# Patient Record
Sex: FEMALE | Race: WHITE | NOT HISPANIC OR LATINO | Employment: OTHER | URBAN - METROPOLITAN AREA
[De-identification: names, ages, dates, MRNs, and addresses within clinical notes are randomized per-mention and may not be internally consistent; named-entity substitution may affect disease eponyms.]

---

## 2018-10-22 ENCOUNTER — TELEPHONE (OUTPATIENT)
Dept: OBSTETRICS AND GYNECOLOGY | Facility: CLINIC | Age: 68
End: 2018-10-22

## 2018-10-22 NOTE — TELEPHONE ENCOUNTER
----- Message from Elidia Santoyo sent at 10/22/2018  9:04 AM CDT -----  Contact: self  Sharon Guzman  MRN: [id number]  Home Phone [phone]  Work Phone   Not on file.  Mobile  [phone]    Patient Care Team:  Lazarus Echols MD as PCP - General (General Practice)  Amirah Quintana MD as Obstetrician (Obstetrics)  OB? No  What phone number can you be reached at? [phone]  Message:   Pt is changing physicians to a [facility] MD. She would like to have mammogram performed at [facility]. She requested assistance with report. Please have nurse return call. Thank you.

## 2022-03-31 PROCEDURE — 83993 ASSAY FOR CALPROTECTIN FECAL: CPT | Performed by: INTERNAL MEDICINE

## 2022-03-31 PROCEDURE — 82705 FATS/LIPIDS FECES QUAL: CPT | Performed by: INTERNAL MEDICINE

## 2022-03-31 PROCEDURE — 82656 EL-1 FECAL QUAL/SEMIQ: CPT | Performed by: INTERNAL MEDICINE

## 2022-04-01 ENCOUNTER — LAB VISIT (OUTPATIENT)
Dept: LAB | Facility: HOSPITAL | Age: 72
End: 2022-04-01
Attending: INTERNAL MEDICINE
Payer: MEDICARE

## 2022-04-01 DIAGNOSIS — R19.7 DIARRHEA: Primary | ICD-10-CM

## 2022-04-04 ENCOUNTER — HOSPITAL ENCOUNTER (OUTPATIENT)
Dept: RADIOLOGY | Facility: HOSPITAL | Age: 72
Discharge: HOME | End: 2022-04-04
Attending: INTERNAL MEDICINE
Payer: MEDICARE

## 2022-04-04 DIAGNOSIS — R19.4 CHANGE IN BOWEL HABITS: ICD-10-CM

## 2022-04-04 DIAGNOSIS — R14.0 BLOATING: ICD-10-CM

## 2022-04-04 LAB
FAT STL QL: NORMAL
NEUTRAL FAT STL QL: NORMAL

## 2022-04-04 PROCEDURE — 25500020 PHARM REV CODE 255: Performed by: RADIOLOGY

## 2022-04-04 PROCEDURE — 74177 CT ABD & PELVIS W/CONTRAST: CPT | Mod: 26,,, | Performed by: RADIOLOGY

## 2022-04-04 PROCEDURE — 74177 CT ABDOMEN PELVIS WITH CONTRAST: ICD-10-PCS | Mod: 26,,, | Performed by: RADIOLOGY

## 2022-04-04 PROCEDURE — 74177 CT ABD & PELVIS W/CONTRAST: CPT | Mod: TC

## 2022-04-04 RX ADMIN — IOHEXOL 100 ML: 350 INJECTION, SOLUTION INTRAVENOUS at 02:04

## 2022-04-04 RX ADMIN — IOHEXOL 30 ML: 350 INJECTION, SOLUTION INTRAVENOUS at 02:04

## 2022-04-05 ENCOUNTER — LAB VISIT (OUTPATIENT)
Dept: LAB | Facility: HOSPITAL | Age: 72
End: 2022-04-05
Attending: INTERNAL MEDICINE
Payer: MEDICARE

## 2022-04-05 DIAGNOSIS — R10.9 ABDOMINAL PAIN: ICD-10-CM

## 2022-04-05 DIAGNOSIS — R19.7 DIARRHEA: Primary | ICD-10-CM

## 2022-04-05 LAB
CRP SERPL-MCNC: 0.9 MG/L (ref 0–8.2)
ERYTHROCYTE [SEDIMENTATION RATE] IN BLOOD BY WESTERGREN METHOD: 12 MM/HR (ref 0–20)

## 2022-04-05 PROCEDURE — 86140 C-REACTIVE PROTEIN: CPT | Performed by: INTERNAL MEDICINE

## 2022-04-05 PROCEDURE — 85651 RBC SED RATE NONAUTOMATED: CPT | Performed by: INTERNAL MEDICINE

## 2022-04-05 PROCEDURE — 36415 COLL VENOUS BLD VENIPUNCTURE: CPT | Performed by: INTERNAL MEDICINE

## 2022-04-06 LAB
CALPROTECTIN STL-MCNT: 85.2 MCG/G
ELASTASE 1, FECAL: 316 MCG/G

## 2022-04-12 ENCOUNTER — LAB VISIT (OUTPATIENT)
Dept: LAB | Facility: HOSPITAL | Age: 72
End: 2022-04-12
Attending: NURSE PRACTITIONER
Payer: MEDICARE

## 2022-04-12 DIAGNOSIS — K65.4 MESENTERIC PANNICULITIS: ICD-10-CM

## 2022-04-12 DIAGNOSIS — R63.4 WEIGHT LOSS: ICD-10-CM

## 2022-04-12 DIAGNOSIS — R14.2 BELCHING: ICD-10-CM

## 2022-04-12 DIAGNOSIS — R14.0 BLOATING: Primary | ICD-10-CM

## 2022-04-12 DIAGNOSIS — R19.7 DIARRHEA: ICD-10-CM

## 2022-04-12 PROCEDURE — 36415 COLL VENOUS BLD VENIPUNCTURE: CPT | Performed by: NURSE PRACTITIONER

## 2022-04-12 PROCEDURE — 86316 IMMUNOASSAY TUMOR OTHER: CPT | Performed by: NURSE PRACTITIONER

## 2022-04-13 LAB — CGA SERPL-MCNC: 211 NG/ML

## 2022-04-19 ENCOUNTER — TELEPHONE (OUTPATIENT)
Dept: NEUROLOGY | Facility: HOSPITAL | Age: 72
End: 2022-04-19
Payer: MEDICARE

## 2022-04-19 DIAGNOSIS — E34.0 CARCINOID SYNDROME: Primary | ICD-10-CM

## 2022-04-19 NOTE — TELEPHONE ENCOUNTER
----- Message from Albaniashimon Carter sent at 4/19/2022  1:42 PM CDT -----  New patient - Who called:fax    Referral source- MD/patient/Internet/other:Lazarus Echols MD     Why do you need to be seen? Elevated chromogranin    Requested physician:  None specified   Patient call back #: [phone]      Records:  Clinic visits  Procedures  Colonoscopy  Egd  Scans  Labs  Pathology

## 2022-04-19 NOTE — TELEPHONE ENCOUNTER
----- Message from Eric Carter sent at 4/19/2022 11:31 AM CDT -----  Pt is being referred for a carcinoid eval. I have scanned the referral/records into media mgr within Epic. Please review and contact pt for scheduling,thanks

## 2022-04-20 ENCOUNTER — TELEPHONE (OUTPATIENT)
Dept: NEUROLOGY | Facility: HOSPITAL | Age: 72
End: 2022-04-20
Payer: MEDICARE

## 2022-04-20 NOTE — TELEPHONE ENCOUNTER
----- Message from Geraldine Marie RN sent at 4/20/2022 10:15 AM CDT -----  New pt, please contact.   Thanks    ----- Message -----  From: Farzana Calixto  Sent: 4/20/2022   9:44 AM CDT  To: Sonia Beckman Staff    Type:  Patient Returning Call    Who Called: pt  Who Left Message for Patient: office  Does the patient know what this is regarding?: appt  Would the patient rather a call back or a response via [facility]?  call  Best Call Back Number: [phone]  Additional Information:

## 2022-04-21 ENCOUNTER — TELEPHONE (OUTPATIENT)
Dept: NEUROLOGY | Facility: HOSPITAL | Age: 72
End: 2022-04-21
Payer: MEDICARE

## 2022-04-21 NOTE — TELEPHONE ENCOUNTER
Returned call.  She had questions about the lab tests ordered, what they were and what they were for. Explained, all questions answered.

## 2022-04-21 NOTE — TELEPHONE ENCOUNTER
----- Message from Sally Mccray sent at 4/21/2022  7:50 AM CDT -----  Contact: [phone]/self  Who Called: PT  Regarding: pt has a few questions about blood work, requesting to speak with christiano   Would the patient rather a call back or a response via [facility]? Call back  Best Call Back Number:[phone]   Additional Information: n/a

## 2022-04-29 ENCOUNTER — LAB VISIT (OUTPATIENT)
Dept: LAB | Facility: HOSPITAL | Age: 72
End: 2022-04-29
Attending: SURGERY
Payer: MEDICARE

## 2022-04-29 DIAGNOSIS — E34.0 CARCINOID SYNDROME: ICD-10-CM

## 2022-04-29 PROCEDURE — 82941 ASSAY OF GASTRIN: CPT | Performed by: SURGERY

## 2022-04-29 PROCEDURE — 86256 FLUORESCENT ANTIBODY TITER: CPT | Performed by: SURGERY

## 2022-04-29 PROCEDURE — 36415 COLL VENOUS BLD VENIPUNCTURE: CPT | Performed by: SURGERY

## 2022-04-29 PROCEDURE — 83519 RIA NONANTIBODY: CPT | Performed by: SURGERY

## 2022-04-29 PROCEDURE — 82542 COL CHROMOTOGRAPHY QUAL/QUAN: CPT | Performed by: SURGERY

## 2022-05-02 LAB — GASTRIN SERPL-MCNC: 21 PG/ML

## 2022-05-04 LAB — PCA AB TITR SER IF: NORMAL {TITER}

## 2022-05-10 LAB — PANCREASTATIN SERPL-MCNC: 70 PG/ML (ref 10–135)

## 2022-05-11 LAB — 5OH-INDOLEACETATE SERPL-MCNC: 8 NG/ML

## 2024-01-25 ENCOUNTER — TELEPHONE (OUTPATIENT)
Dept: PSYCHIATRY | Facility: CLINIC | Age: 74
End: 2024-01-25
Payer: MEDICARE

## 2024-01-25 NOTE — TELEPHONE ENCOUNTER
Informed patient that we do not see patients over the age of 69 and gave her other recommendations. She voiced understanding

## 2024-01-25 NOTE — TELEPHONE ENCOUNTER
----- Message from Jazmin Ramirez sent at 2024  9:05 AM CST -----  Contact: Patient  Sharon Guzman  MRN: [id number]  : 1950  PCP: Mary Jo, Primary Doctor  Home Phone      [phone]  Work Phone      Not on file.  Mobile          [phone]      MESSAGE: Patient would like to be seen for anxiety. She has been having panic attacks everyday    PHONE; [phone]

## 2024-11-04 ENCOUNTER — HOSPITAL ENCOUNTER (EMERGENCY)
Facility: HOSPITAL | Age: 74
Discharge: HOME | End: 2024-11-04
Attending: EMERGENCY MEDICINE
Payer: MEDICARE

## 2024-11-04 VITALS
WEIGHT: 179 LBS | DIASTOLIC BLOOD PRESSURE: 68 MMHG | TEMPERATURE: 98 F | RESPIRATION RATE: 20 BRPM | HEART RATE: 74 BPM | OXYGEN SATURATION: 98 % | SYSTOLIC BLOOD PRESSURE: 148 MMHG | BODY MASS INDEX: 26.51 KG/M2 | HEIGHT: 69 IN
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
## 2024-11-04 DIAGNOSIS — R07.9 CHEST PAIN: ICD-10-CM

## 2024-11-04 DIAGNOSIS — R07.89 CHEST WALL PAIN: ICD-10-CM

## 2024-11-04 PROCEDURE — 25000003 PHARM REV CODE 250: Performed by: EMERGENCY MEDICINE

## 2024-11-04 PROCEDURE — 93010 ELECTROCARDIOGRAM REPORT: CPT | Mod: ,,, | Performed by: INTERNAL MEDICINE

## 2024-11-04 PROCEDURE — 93005 ELECTROCARDIOGRAM TRACING: CPT

## 2024-11-04 PROCEDURE — 99284 EMERGENCY DEPT VISIT MOD MDM: CPT | Mod: 25

## 2024-11-04 RX ORDER — METHOCARBAMOL 500 MG/1
1000 TABLET, FILM COATED ORAL 3 TIMES DAILY
Qty: 30 TABLET | Refills: 0 | Status: SHIPPED | OUTPATIENT
Start: 2024-11-04 | End: 2024-11-09

## 2024-11-04 RX ORDER — MUPIROCIN 20 MG/G
OINTMENT TOPICAL 3 TIMES DAILY
Qty: 15 G | Refills: 0 | Status: SHIPPED | OUTPATIENT
Start: 2024-11-04

## 2024-11-04 RX ORDER — METHOCARBAMOL 500 MG/1
1000 TABLET, FILM COATED ORAL
Status: COMPLETED | OUTPATIENT
Start: 2024-11-04 | End: 2024-11-04

## 2024-11-04 RX ADMIN — METHOCARBAMOL 1000 MG: 500 TABLET ORAL at 04:11

## 2024-11-04 NOTE — ED TRIAGE NOTES
Patient reports that her shoe got caught on the floor and caused her to fall hitting a love seat pta. Patient became SOB, weak, and dizzy after falling. Patient with c/o pain to right side of chest and skin tears to right upper arm. Reports also hit her head.

## 2024-11-04 NOTE — ED PROVIDER NOTES
Encounter Date: 11/4/2024       History     Chief Complaint   Patient presents with    Fall    Chest Pain     HPI      Patient is a 74-year- female past medical history  hyperlipidemia presenting after a slip and fall.  She hit her head.  No loss of consciousness.  She states she caught herself with her right arm and she feels pain from her right shoulder into her chest.  The pain is so beer she was having difficulty taking a deep breath.  Review of patient's allergies indicates:  No Known Allergies  Past Medical History:   Diagnosis Date    Anemia     Diverticulitis     Hyperlipidemia     Hypoglycemia     Mental disorder      Past Surgical History:   Procedure Laterality Date    APPENDECTOMY      HYSTERECTOMY      ZA-BSO--Fibroids     OOPHORECTOMY       Family History   Problem Relation Name Age of Onset    Colon cancer Paternal Grandmother      Breast cancer Neg Hx      Ovarian cancer Neg Hx       Social History     Tobacco Use    Smoking status: Never   Substance Use Topics    Alcohol use: No    Drug use: No     Review of Systems   Constitutional:  Negative for chills and fever.   Respiratory:  Negative for cough.    Cardiovascular:  Positive for chest pain.   Musculoskeletal:  Positive for back pain.   Neurological:  Positive for headaches.   All other systems reviewed and are negative.    Social Drivers of Health     Tobacco Use: Unknown (11/4/2024)    Patient History     Smoking Tobacco Use: Never     Smokeless Tobacco Use: Unknown     Passive Exposure: Not on file   Alcohol Use: Not on file   Financial Resource Strain: Not on file   Food Insecurity: Not on file   Transportation Needs: Not on file   Physical Activity: Not on file   Stress: Not on file   Housing Stability: Not on file   Depression: Not on file   Utilities: Not on file   Health Literacy: Not on file   Social Isolation: Not on file      Physical Exam     Initial Vitals [11/04/24 1634]   BP Pulse Resp Temp SpO2   138/67 64 20 98.3 °F (36.8 °C)  100 %      MAP       --         Physical Exam    Nursing note and vitals reviewed.  Constitutional: She appears well-developed and well-nourished.   HENT:   Head: Normocephalic and atraumatic. Mouth/Throat: Oropharynx is clear and moist.   Eyes: EOM are normal. Pupils are equal, round, and reactive to light.   Neck: No JVD present.   Normal range of motion.  Cardiovascular:  Normal rate and intact distal pulses.           Pulmonary/Chest: Breath sounds normal. No stridor.   Abdominal: Abdomen is soft.   Musculoskeletal:         General: Normal range of motion.      Cervical back: Normal range of motion.      Comments: No midline c spine tenderness     Neurological: She is alert and oriented to person, place, and time. GCS score is 15. GCS eye subscore is 4. GCS verbal subscore is 5. GCS motor subscore is 6.   Skin: Capillary refill takes less than 2 seconds.     Dictation #1  MRN:[id number]  CSN:[id number]     ED Course   Procedures  Labs Reviewed - No data to display  EKG Readings: (Independently Interpreted)   Initial Reading: No STEMI. Rhythm: Normal Sinus Rhythm. Heart Rate: 73. Ectopy: No Ectopy. Conduction: Normal. ST Segments: Normal ST Segments. T Waves: Normal. Axis: Normal.     ECG Results              EKG 12-lead (Final result)        Collection Time Result Time QRS Duration OHS QTC Calculation    11/04/24 16:34:38 11/06/24 13:55:39 98 425                     Final result by Interface, Lab [facility] (11/06/24 13:55:48)                   Narrative:    Test Reason : R07.9    Vent. Rate : 073 BPM     Atrial Rate : 073 BPM     P-R Int : 174 ms          QRS Dur : 098 ms      QT Int : 386 ms       P-R-T Axes : 079 068 066 degrees     QTc Int : 425 ms    Normal sinus rhythm  Normal ECG  No previous ECGs available  Confirmed by Milton Calderon MD ([id number]) on 11/6/2024 1:55:34 PM    Referred By: [email or web address]   SELF           Confirmed By:Milton Calderon MD                                  Imaging Results              CT  Chest Without Contrast (Final result)  Result time 11/04/24 17:54:03      Final result by Semaj Smith MD (11/04/24 17:54:03)                   Impression:      No CT evidence of acute intrathoracic process.  No definite acute displaced rib fractures identified.    Several small pulmonary micronodules measuring up to 0.3 cm.  For multiple solid nodules all <6 mm, Fleischner Society 2017 guidelines recommend no routine follow up for a low risk patient, or follow up with non-contrast chest CT at 12 months after discovery in a high risk patient.    Mild coronary artery calcification.    Additional incidental findings as above.      Electronically signed by: Semaj Smith MD  Date:    11/04/2024  Time:    17:54               Narrative:    EXAMINATION:  CT CHEST WITHOUT CONTRAST    CLINICAL HISTORY:  trauma;    TECHNIQUE:  Low dose axial images, sagittal and coronal reformations were obtained from the thoracic inlet to the lung bases. Contrast was not administered.    COMPARISON:  None.    FINDINGS:  Examination of the vascular and soft tissue structures at the base of the neck is unremarkable.    The thoracic aorta maintains normal caliber, contour, and course with mild atherosclerotic calcification within its course.  The heart is not enlarged and there is no evidence of pericardial effusion.  There is mild coronary artery calcification.The esophagus maintains a normal course and caliber. There is no bulky axillary or mediastinal lymph node enlargement.  Evaluation of the hilar region is limited by lack of IV contrast.    The trachea is midline and the proximal airways are patent. There is no pneumothorax.  There is mild biapical pleuroparenchymal scarring with scattered peripheral reticular opacities of the bilateral upper lobes.  There is no large confluent airspace consolidation.  There are a few scattered pulmonary micronodules throughout the lungs measuring up to 0.3 cm.  There is no significant pleural  fluid.    Limited views of the abdomen demonstrate nothing unusual on this noncontrast examination.    Osseous structures demonstrate mild degenerative changes of the visualized thoracic spine.  No definite acute displaced rib fractures identified..                                       CT Cervical Spine Without Contrast (Final result)  Result time 11/04/24 17:52:12      Final result by Semaj Smith MD (11/04/24 17:52:12)                   Impression:      No CT evidence of acute fracture or traumatic subluxation of the cervical spine.      Electronically signed by: Semaj Smith MD  Date:    11/04/2024  Time:    17:52               Narrative:    EXAMINATION:  CT CERVICAL SPINE WITHOUT CONTRAST    CLINICAL HISTORY:  fall and closed head injury;    TECHNIQUE:  Low dose axial images, sagittal and coronal reformations were performed though the cervical spine.  Contrast was not administered.    COMPARISON:  None    FINDINGS:  Cervical spine alignment appears within normal limits.  Cervical vertebral body heights appear adequately maintained.  No acute displaced fracture identified.  There is intervertebral disc space height loss at C4-C5 and C6-C7.  At the level of C6-C7, there is a broad-based posterior disc osteophyte complex with mild effacement of the anterior thecal sac and mild spinal canal narrowing.  Additionally, there are varying degrees of mild to moderate bilateral neural foraminal narrowing throughout the cervical spine secondary to uncovertebral spurring and facet arthropathy.    The prevertebral soft tissues are not significantly thickened.  There is atherosclerotic calcification of the carotid vasculature.  Trachea is midline and patent.  There is biapical pleuroparenchymal scarring.                                       CT Head Without Contrast (Final result)  Result time 11/04/24 17:51:39      Final result by Semaj Smith MD (11/04/24 17:51:39)                   Impression:      No CT  evidence of acute intracranial abnormality. Clinical correlation and further evaluation as warranted.    Mild chronic senescent and microvascular ischemic changes.      Electronically signed by: Smeaj Smith MD  Date:    11/04/2024  Time:    17:51               Narrative:    EXAMINATION:  CT HEAD WITHOUT CONTRAST    CLINICAL HISTORY:  Facial trauma, blunt;    TECHNIQUE:  Low dose axial images were obtained through the head.  Coronal and sagittal reformations were also performed. Contrast was not administered.    COMPARISON:  None.    FINDINGS:  There is mild generalized cerebral volume loss.  There is mild patchy periventricular white matter hypoattenuation suggesting sequelae of chronic microvascular ischemic change.  No CT evidence of acute intracranial hemorrhage.  There is no midline shift or hydrocephalus.  The basal cisterns are patent. The mastoid air cells and paranasal sinuses are clear of acute process. No acute displaced calvarial fracture identified.                                       Medications   methocarbamoL tablet 1,000 mg (1,000 mg Oral Given 11/4/24 7337)     Medical Decision Making  This is an emergent evaluation of a 74y WF hx HLD, anemia presenting after slip and fall with right shoulder pain and LOC.  Exam she is non toxic, afebrile with no focal neurological deficits and diffuse pain to the right shoulder and right lateral chest wall.  Imaging of the head, C spine and chest negative for acute findings.  Patient given reassurance.  Discharge with robaxin and PCP follow up.    DDX: contusion, closed head injury, fracture, traumatic subdural    Amount and/or Complexity of Data Reviewed  Radiology: ordered.    Risk  Prescription drug management.                                      Clinical Impression:  Final diagnoses:  [R07.89] Chest wall pain  [R07.9] Chest pain  [W19.XXXA] Fall, initial encounter (Primary)          ED Disposition Condition    Discharge Stable          ED Prescriptions        Medication Sig Dispense Start Date End Date Auth. Provider    methocarbamoL (ROBAXIN) 500 MG Tab () Take 2 tablets (1,000 mg total) by mouth 3 (three) times daily. for 5 days 30 tablet 2024 Yumi Navarro MD    mupirocin (BACTROBAN) 2 % ointment Apply topically 3 (three) times daily. 15 g 2024 -- Rosalio Shaver MD          Follow-up Information       Follow up With Specialties Details Why Contact Info    Rigo George MD Family Medicine Schedule an appointment as soon as possible for a visit in 1 day As needed [address] [place] [id number]  [phone]               Yumi Navarro MD  24 1265

## 2024-11-06 LAB
OHS QRS DURATION: 98 MS
OHS QTC CALCULATION: 425 MS